# Patient Record
Sex: FEMALE | Race: WHITE | URBAN - METROPOLITAN AREA
[De-identification: names, ages, dates, MRNs, and addresses within clinical notes are randomized per-mention and may not be internally consistent; named-entity substitution may affect disease eponyms.]

---

## 2022-12-13 ENCOUNTER — WEB ENCOUNTER (OUTPATIENT)
Dept: URBAN - METROPOLITAN AREA CLINIC 70 | Facility: CLINIC | Age: 37
End: 2022-12-13

## 2022-12-14 ENCOUNTER — OFFICE VISIT (OUTPATIENT)
Dept: URBAN - METROPOLITAN AREA CLINIC 70 | Facility: CLINIC | Age: 37
End: 2022-12-14
Payer: COMMERCIAL

## 2022-12-14 ENCOUNTER — LAB OUTSIDE AN ENCOUNTER (OUTPATIENT)
Dept: URBAN - METROPOLITAN AREA CLINIC 70 | Facility: CLINIC | Age: 37
End: 2022-12-14

## 2022-12-14 VITALS
TEMPERATURE: 97.6 F | DIASTOLIC BLOOD PRESSURE: 82 MMHG | HEART RATE: 71 BPM | HEIGHT: 60 IN | WEIGHT: 154.8 LBS | BODY MASS INDEX: 30.39 KG/M2 | SYSTOLIC BLOOD PRESSURE: 124 MMHG

## 2022-12-14 DIAGNOSIS — R10.30 LOWER ABDOMINAL PAIN: ICD-10-CM

## 2022-12-14 DIAGNOSIS — R14.0 BLOATING: ICD-10-CM

## 2022-12-14 DIAGNOSIS — R14.2 BELCHING: ICD-10-CM

## 2022-12-14 PROCEDURE — 99204 OFFICE O/P NEW MOD 45 MIN: CPT

## 2022-12-14 NOTE — HPI-TODAY'S VISIT:
The pt presents for bloating and lower abdominal pain.  She admits to occasional bloating and lower abdominal pain that causes her to double over in pain.  She has tried Gas X with no improvement in symptoms. She denies recent imaging or labs.  She denies known food allergy, constipation, diarrhea, rectal bleeding, melena, or weight loss.  She is not able to correlate symptoms with meals or bowel movements.  She has a fhx of Crohn's disease in her mother. She admits to occasional belching and chest tightness. She has taken Pepcid OTC in the past for these symptoms.

## 2022-12-16 LAB
BUN/CREATININE RATIO: (no result)
BUN: 17
C-REACTIVE PROTEIN, QUANT: 1.3
CREATININE: 0.64
EGFR: 116
IMMUNOGLOBULIN A, QN, SERUM: 283
INTERPRETATION: (no result)
SED RATE BY MODIFIED: 2
T-TRANSGLUTAMINASE (TTG) IGA: <1

## 2023-01-25 ENCOUNTER — OFFICE VISIT (OUTPATIENT)
Dept: URBAN - METROPOLITAN AREA CLINIC 70 | Facility: CLINIC | Age: 38
End: 2023-01-25
Payer: COMMERCIAL

## 2023-01-25 ENCOUNTER — WEB ENCOUNTER (OUTPATIENT)
Dept: URBAN - METROPOLITAN AREA CLINIC 70 | Facility: CLINIC | Age: 38
End: 2023-01-25

## 2023-01-25 VITALS
WEIGHT: 160 LBS | SYSTOLIC BLOOD PRESSURE: 108 MMHG | DIASTOLIC BLOOD PRESSURE: 74 MMHG | HEART RATE: 84 BPM | HEIGHT: 60 IN | BODY MASS INDEX: 31.41 KG/M2 | TEMPERATURE: 98.1 F

## 2023-01-25 DIAGNOSIS — R10.30 LOWER ABDOMINAL PAIN: ICD-10-CM

## 2023-01-25 DIAGNOSIS — R14.0 BLOATING: ICD-10-CM

## 2023-01-25 DIAGNOSIS — R14.2 BELCHING: ICD-10-CM

## 2023-01-25 PROCEDURE — 99214 OFFICE O/P EST MOD 30 MIN: CPT

## 2023-01-25 RX ORDER — DICYCLOMINE HYDROCHLORIDE 20 MG/1
1 TABLET TABLET ORAL THREE TIMES A DAY
Qty: 270 TABLET | Refills: 3 | OUTPATIENT
Start: 2023-01-25 | End: 2024-01-20

## 2023-01-25 NOTE — HPI-TODAY'S VISIT:
The pt presents for a f/u for lower abdominal pain and bloating. Labs were normal. CT 12/16/2022 was normal.  Today the pt admits to continued belching,  bloating, and lower abdominal cramps.  She has multiple questions regarding diet and online supplements for bloating and IBS.  She denies indigestion, regurgitation, nausea, constipation, diarrhea, or rectal bleeding. She admits to occasional rectal itching associated with hx of hemorrhoids.

## 2023-01-25 NOTE — HPI-OTHER HISTORIES
OV 12/14/2022: The pt presents for bloating and lower abdominal pain.  She admits to occasional bloating and lower abdominal pain that causes her to double over in pain.  She has tried Gas X with no improvement in symptoms. She denies recent imaging or labs.  She denies known food allergy, constipation, diarrhea, rectal bleeding, melena, or weight loss.  She is not able to correlate symptoms with meals or bowel movements.  She has a fhx of Crohn's disease in her mother. She admits to occasional belching and chest tightness. She has taken Pepcid OTC in the past for these symptoms.

## 2023-03-08 ENCOUNTER — OFFICE VISIT (OUTPATIENT)
Dept: URBAN - METROPOLITAN AREA CLINIC 70 | Facility: CLINIC | Age: 38
End: 2023-03-08

## 2023-06-06 ENCOUNTER — WEB ENCOUNTER (OUTPATIENT)
Dept: URBAN - METROPOLITAN AREA CLINIC 70 | Facility: CLINIC | Age: 38
End: 2023-06-06

## 2023-06-09 ENCOUNTER — WEB ENCOUNTER (OUTPATIENT)
Dept: URBAN - METROPOLITAN AREA CLINIC 70 | Facility: CLINIC | Age: 38
End: 2023-06-09

## 2023-06-09 ENCOUNTER — OFFICE VISIT (OUTPATIENT)
Dept: URBAN - METROPOLITAN AREA CLINIC 70 | Facility: CLINIC | Age: 38
End: 2023-06-09
Payer: COMMERCIAL

## 2023-06-09 ENCOUNTER — DASHBOARD ENCOUNTERS (OUTPATIENT)
Age: 38
End: 2023-06-09

## 2023-06-09 ENCOUNTER — LAB OUTSIDE AN ENCOUNTER (OUTPATIENT)
Dept: URBAN - METROPOLITAN AREA CLINIC 70 | Facility: CLINIC | Age: 38
End: 2023-06-09

## 2023-06-09 VITALS
HEIGHT: 60 IN | SYSTOLIC BLOOD PRESSURE: 107 MMHG | BODY MASS INDEX: 29.8 KG/M2 | HEART RATE: 72 BPM | WEIGHT: 151.8 LBS | DIASTOLIC BLOOD PRESSURE: 68 MMHG

## 2023-06-09 DIAGNOSIS — R10.32 LEFT LOWER QUADRANT ABDOMINAL PAIN: ICD-10-CM

## 2023-06-09 DIAGNOSIS — Z83.79 FAMILY HISTORY OF CROHN'S DISEASE: ICD-10-CM

## 2023-06-09 PROCEDURE — 99214 OFFICE O/P EST MOD 30 MIN: CPT

## 2023-06-09 RX ORDER — DICYCLOMINE HYDROCHLORIDE 20 MG/1
1 TABLET TABLET ORAL THREE TIMES A DAY
Qty: 270 TABLET | Refills: 3 | Status: ACTIVE | COMMUNITY
Start: 2023-01-25 | End: 2024-01-20

## 2023-06-09 RX ORDER — DICYCLOMINE HYDROCHLORIDE 20 MG/1
1 TABLET TABLET ORAL THREE TIMES A DAY
OUTPATIENT
Start: 2023-01-25

## 2023-06-09 RX ORDER — SPIRONOLACTONE 25 MG/1
1 TABLET TABLET, FILM COATED ORAL
Status: ACTIVE | COMMUNITY

## 2023-06-09 RX ORDER — SEMAGLUTIDE 0.5 MG/.5ML
0.5 ML INJECTION, SOLUTION SUBCUTANEOUS
Status: DISCONTINUED | COMMUNITY

## 2023-06-09 NOTE — PHYSICAL EXAM GASTROINTESTINAL
Abdomen , soft, mild RLQ and LLQ TTP, nondistended , no guarding or rigidity , no masses palpable , normal bowel sounds , Liver and Spleen , no hepatosplenomegaly , liver nontender

## 2023-06-09 NOTE — HPI-OTHER HISTORIES
OV 1/25/2023: The pt presents for a f/u for lower abdominal pain and bloating. Labs were normal. CT 12/16/2022 was normal.  Today the pt admits to continued belching,  bloating, and lower abdominal cramps.  She has multiple questions regarding diet and online supplements for bloating and IBS.  She denies indigestion, regurgitation, nausea, constipation, diarrhea, or rectal bleeding. She admits to occasional rectal itching associated with hx of hemorrhoids. ------------------------------- OV 12/14/2022: The pt presents for bloating and lower abdominal pain.  She admits to occasional bloating and lower abdominal pain that causes her to double over in pain.  She has tried Gas X with no improvement in symptoms. She denies recent imaging or labs.  She denies known food allergy, constipation, diarrhea, rectal bleeding, melena, or weight loss.  She is not able to correlate symptoms with meals or bowel movements.  She has a fhx of Crohn's disease in her mother. She admits to occasional belching and chest tightness. She has taken Pepcid OTC in the past for these symptoms.

## 2023-06-09 NOTE — HPI-TODAY'S VISIT:
The pt presents for lower abdominal pain.  She states abdominal pain and constipation had improved with dicyclomine, but over the past 2 weeks symptoms have increased.  She has a CT 12/2022 which was normal.  CT 5/23/2023 showed an ovarian cyst.  She has a f/u scheduled with her GYN. She admits to a fhx of Crohns in her mother.  She has never had a colonoscopy.   She denies weight loss, rectal bleeding, nausea, vomiting, constipation, or diarrhea.

## 2023-06-29 ENCOUNTER — OFFICE VISIT (OUTPATIENT)
Dept: URBAN - METROPOLITAN AREA SURGERY CENTER 24 | Facility: SURGERY CENTER | Age: 38
End: 2023-06-29
Payer: COMMERCIAL

## 2023-06-29 DIAGNOSIS — R10.30 ABDOMINAL PAIN OF UNKNOWN CAUSE: ICD-10-CM

## 2023-06-29 DIAGNOSIS — Z83.79 FAMILY HISTORY OF ACUTE CHOLECYSTITIS: ICD-10-CM

## 2023-06-29 PROCEDURE — 45378 DIAGNOSTIC COLONOSCOPY: CPT | Performed by: INTERNAL MEDICINE

## 2023-06-29 PROCEDURE — G8907 PT DOC NO EVENTS ON DISCHARG: HCPCS | Performed by: INTERNAL MEDICINE

## 2023-06-29 RX ORDER — SPIRONOLACTONE 25 MG/1
1 TABLET TABLET, FILM COATED ORAL
Status: ACTIVE | COMMUNITY

## 2023-06-29 RX ORDER — DICYCLOMINE HYDROCHLORIDE 20 MG/1
1 TABLET TABLET ORAL THREE TIMES A DAY
Status: ACTIVE | COMMUNITY
Start: 2023-01-25

## 2023-07-03 ENCOUNTER — TELEPHONE ENCOUNTER (OUTPATIENT)
Dept: URBAN - METROPOLITAN AREA CLINIC 70 | Facility: CLINIC | Age: 38
End: 2023-07-03

## 2023-11-29 NOTE — PHYSICAL EXAM HENT:
Head,  normocephalic,  atraumatic,  Face,  Face within normal limits,  Ears,  External ears within normal limits,  Nose/Nasopharynx,  External nose  normal appearance, Mouth and Throat,  Breath odor normal,  Lips,  Appearance normal
Yes